# Patient Record
Sex: MALE | Race: WHITE | NOT HISPANIC OR LATINO | URBAN - NONMETROPOLITAN AREA
[De-identification: names, ages, dates, MRNs, and addresses within clinical notes are randomized per-mention and may not be internally consistent; named-entity substitution may affect disease eponyms.]

---

## 2023-05-04 ENCOUNTER — LAB OUTSIDE AN ENCOUNTER (OUTPATIENT)
Dept: URBAN - NONMETROPOLITAN AREA CLINIC 13 | Facility: CLINIC | Age: 68
End: 2023-05-04

## 2023-05-04 ENCOUNTER — WEB ENCOUNTER (OUTPATIENT)
Dept: URBAN - NONMETROPOLITAN AREA CLINIC 13 | Facility: CLINIC | Age: 68
End: 2023-05-04

## 2023-05-04 ENCOUNTER — OFFICE VISIT (OUTPATIENT)
Dept: URBAN - NONMETROPOLITAN AREA CLINIC 13 | Facility: CLINIC | Age: 68
End: 2023-05-04
Payer: MEDICARE

## 2023-05-04 ENCOUNTER — LAB OUTSIDE AN ENCOUNTER (OUTPATIENT)
Dept: URBAN - NONMETROPOLITAN AREA CLINIC 2 | Facility: CLINIC | Age: 68
End: 2023-05-04

## 2023-05-04 VITALS
BODY MASS INDEX: 26.51 KG/M2 | DIASTOLIC BLOOD PRESSURE: 91 MMHG | HEART RATE: 71 BPM | HEIGHT: 70 IN | WEIGHT: 185.2 LBS | SYSTOLIC BLOOD PRESSURE: 184 MMHG

## 2023-05-04 DIAGNOSIS — B18.2 CHRONIC HEPATITIS C WITHOUT HEPATIC COMA: ICD-10-CM

## 2023-05-04 DIAGNOSIS — K74.69 OTHER CIRRHOSIS OF LIVER: ICD-10-CM

## 2023-05-04 PROBLEM — 19943007: Status: ACTIVE | Noted: 2023-05-04

## 2023-05-04 PROBLEM — 128302006: Status: ACTIVE | Noted: 2023-05-04

## 2023-05-04 PROCEDURE — 99204 OFFICE O/P NEW MOD 45 MIN: CPT | Performed by: INTERNAL MEDICINE

## 2023-05-04 RX ORDER — ATORVASTATIN CALCIUM 20 MG/1
TABLET, FILM COATED ORAL
Qty: 90 TABLET | Status: ACTIVE | COMMUNITY

## 2023-05-04 RX ORDER — TAFLUPROST 0.01 MG/ML
SOLUTION/ DROPS OPHTHALMIC
Qty: 90 EACH | Status: ACTIVE | COMMUNITY

## 2023-05-04 RX ORDER — FLUOROMETHOLONE 1 MG/ML
INSTILL ONE DROP INTO EACH EYE TWICE DAILY FOR 1 WEEK THEN STOP SOLUTION/ DROPS OPHTHALMIC
Qty: 10 UNSPECIFIED | Refills: 0 | Status: ACTIVE | COMMUNITY

## 2023-05-04 NOTE — HPI-TODAY'S VISIT:
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  A copy of records will be sent to his office.  He was previously seen by me for hepatitis C cirrhosis and treated with Harvoni.  He was eradicated.  He then saw Dr. Mcdaniels.  He has been seeing him routinely.  His MRIs have been normal.  He does not have any overt encephalopathy.  He has been on rifaximin and lactulose in the past without significant improvement.  He has not had labs in over 6 months.  He has grade 1 varices but cannot tolerate beta-blockers due to depression.  Currently he seems to be doing well without any bleeding, encephalopathy or other significant symptoms.

## 2023-05-07 LAB
A/G RATIO: 1.7
ALBUMIN: 4.6
ALKALINE PHOSPHATASE: 74
ALT (SGPT): 55
ANION GAP: 13
AST (SGOT): 30
BILIRUBIN TOTAL: 0.7
BLOOD UREA NITROGEN: 14
BUN / CREAT RATIO: 14
CALCIUM: 9.4
CHLORIDE: 104
CO2: 27
CREATININE, SERUM: 0.98
EGFR (CKD-EPI): >60
GLUCOSE: 99
HEMATOCRIT: 44.4
HEMOGLOBIN: 15
INR: 1.1
MEAN CORPUSCULAR HEMOGLOBIN CONC: 33.8
MEAN CORPUSCULAR HEMOGLOBIN: 30.3
MEAN CORPUSCULAR VOLUME: 89.5
MEAN PLATELET VOLUME: 10.3
PLATELET COUNT: 248
POTASSIUM: 4
PROTEIN TOTAL: 7.3
PROTHROMBIN TIME: 12.2
RED BLOOD CELL COUNT: 4.95
RED CELL DISTRIBUTION WIDTH: 14
SODIUM: 140
WHITE BLOOD CELL COUNT: 9.3

## 2023-05-13 LAB — AFP-TUMOR MARKER: 2.6

## 2023-09-15 ENCOUNTER — OFFICE VISIT (OUTPATIENT)
Dept: URBAN - METROPOLITAN AREA MEDICAL CENTER 1 | Facility: MEDICAL CENTER | Age: 68
End: 2023-09-15
Payer: MEDICARE

## 2023-09-15 ENCOUNTER — LAB OUTSIDE AN ENCOUNTER (OUTPATIENT)
Dept: URBAN - NONMETROPOLITAN AREA CLINIC 2 | Facility: CLINIC | Age: 68
End: 2023-09-15

## 2023-09-15 DIAGNOSIS — K29.60 ADENOPAPILLOMATOSIS GASTRICA: ICD-10-CM

## 2023-09-15 DIAGNOSIS — K74.60 ADVANCED CIRRHOSIS: ICD-10-CM

## 2023-09-15 PROCEDURE — 43239 EGD BIOPSY SINGLE/MULTIPLE: CPT | Performed by: INTERNAL MEDICINE

## 2023-09-20 LAB
AP CASE REPORT: (no result)
AP FINAL DIAGNOSIS: (no result)
AP GROSS DESCRIPTION: (no result)
AP MICROSCOPIC DESCRIPTION: (no result)
AP SPECIAL STAINS: (no result)

## 2023-11-09 ENCOUNTER — OFFICE VISIT (OUTPATIENT)
Dept: URBAN - NONMETROPOLITAN AREA CLINIC 13 | Facility: CLINIC | Age: 68
End: 2023-11-09
Payer: MEDICARE

## 2023-11-09 ENCOUNTER — DASHBOARD ENCOUNTERS (OUTPATIENT)
Age: 68
End: 2023-11-09

## 2023-11-09 VITALS
HEART RATE: 98 BPM | SYSTOLIC BLOOD PRESSURE: 165 MMHG | HEIGHT: 70 IN | BODY MASS INDEX: 25.91 KG/M2 | WEIGHT: 181 LBS | DIASTOLIC BLOOD PRESSURE: 98 MMHG

## 2023-11-09 DIAGNOSIS — B18.2 CHRONIC HEPATITIS C WITHOUT HEPATIC COMA: ICD-10-CM

## 2023-11-09 DIAGNOSIS — K74.69 OTHER CIRRHOSIS OF LIVER: ICD-10-CM

## 2023-11-09 PROCEDURE — 99214 OFFICE O/P EST MOD 30 MIN: CPT | Performed by: INTERNAL MEDICINE

## 2023-11-09 RX ORDER — ATORVASTATIN CALCIUM 20 MG/1
TABLET, FILM COATED ORAL
Qty: 90 TABLET | Status: ACTIVE | COMMUNITY

## 2023-11-09 RX ORDER — FLUOROMETHOLONE 1 MG/ML
INSTILL ONE DROP INTO EACH EYE TWICE DAILY FOR 1 WEEK THEN STOP SOLUTION/ DROPS OPHTHALMIC
Qty: 10 UNSPECIFIED | Refills: 0 | Status: ACTIVE | COMMUNITY

## 2023-11-09 RX ORDER — TAFLUPROST 0.01 MG/ML
SOLUTION/ DROPS OPHTHALMIC
Qty: 90 EACH | Status: ACTIVE | COMMUNITY

## 2023-11-09 NOTE — HPI-TODAY'S VISIT:
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  A copy of records will be sent to his office.  He was previously seen by me for hepatitis C cirrhosis and treated with Harvoni.  He was eradicated.  He then saw Dr. Mcdaniels.  He has been seeing him routinely.  His MRIs have been normal.  He does not have any overt encephalopathy.  He has been on rifaximin and lactulose in the past without significant improvement.  He has not had labs in over 6 months.  He has grade 1 varices but cannot tolerate beta-blockers due to depression.  Currently he seems to be doing well without any bleeding, encephalopathy or other significant symptoms. 11/9/2023  Mini returns for follow-up.  He has cirrhosis due to past, treated hepatitis C.  He is currently doing well.  He denies any encephalopathy, ascites, or edema.  He had an MRI with his PCP this summer that was unremarkable reportedly.  He is due for labs today.  Upper endoscopy did not suggest significant

## 2024-05-03 ENCOUNTER — WEB ENCOUNTER (OUTPATIENT)
Dept: URBAN - NONMETROPOLITAN AREA CLINIC 13 | Facility: CLINIC | Age: 69
End: 2024-05-03

## 2024-05-09 ENCOUNTER — OFFICE VISIT (OUTPATIENT)
Dept: URBAN - NONMETROPOLITAN AREA CLINIC 13 | Facility: CLINIC | Age: 69
End: 2024-05-09

## 2024-07-09 ENCOUNTER — APPOINTMENT (OUTPATIENT)
Dept: URBAN - NONMETROPOLITAN AREA CLINIC 45 | Age: 69
Setting detail: DERMATOLOGY
End: 2024-07-09

## 2024-07-09 DIAGNOSIS — D22 MELANOCYTIC NEVI: ICD-10-CM

## 2024-07-09 DIAGNOSIS — D18.0 HEMANGIOMA: ICD-10-CM

## 2024-07-09 DIAGNOSIS — L21.8 OTHER SEBORRHEIC DERMATITIS: ICD-10-CM

## 2024-07-09 DIAGNOSIS — L57.8 OTHER SKIN CHANGES DUE TO CHRONIC EXPOSURE TO NONIONIZING RADIATION: ICD-10-CM

## 2024-07-09 DIAGNOSIS — L82.1 OTHER SEBORRHEIC KERATOSIS: ICD-10-CM

## 2024-07-09 PROBLEM — D48.5 NEOPLASM OF UNCERTAIN BEHAVIOR OF SKIN: Status: ACTIVE | Noted: 2024-07-09

## 2024-07-09 PROBLEM — D18.01 HEMANGIOMA OF SKIN AND SUBCUTANEOUS TISSUE: Status: ACTIVE | Noted: 2024-07-09

## 2024-07-09 PROBLEM — D22.71 MELANOCYTIC NEVI OF RIGHT LOWER LIMB, INCLUDING HIP: Status: ACTIVE | Noted: 2024-07-09

## 2024-07-09 PROCEDURE — OTHER OBSERVATION: OTHER

## 2024-07-09 PROCEDURE — 99204 OFFICE O/P NEW MOD 45 MIN: CPT

## 2024-07-09 PROCEDURE — OTHER PRESCRIPTION MEDICATION MANAGEMENT: OTHER

## 2024-07-09 PROCEDURE — OTHER COUNSELING: OTHER

## 2024-07-09 PROCEDURE — OTHER MIPS QUALITY: OTHER

## 2024-07-09 PROCEDURE — OTHER ADDITIONAL NOTES: OTHER

## 2024-07-09 ASSESSMENT — LOCATION SIMPLE DESCRIPTION DERM
LOCATION SIMPLE: LEFT ANTERIOR NECK
LOCATION SIMPLE: ABDOMEN
LOCATION SIMPLE: RIGHT THIGH
LOCATION SIMPLE: LEFT UPPER ARM

## 2024-07-09 ASSESSMENT — LOCATION DETAILED DESCRIPTION DERM
LOCATION DETAILED: LEFT ANTERIOR DISTAL UPPER ARM
LOCATION DETAILED: LEFT CLAVICULAR NECK
LOCATION DETAILED: LEFT LATERAL ABDOMEN
LOCATION DETAILED: RIGHT ANTERIOR DISTAL THIGH

## 2024-07-09 ASSESSMENT — LOCATION ZONE DERM
LOCATION ZONE: ARM
LOCATION ZONE: NECK
LOCATION ZONE: TRUNK
LOCATION ZONE: LEG

## 2024-07-09 NOTE — PROCEDURE: PRESCRIPTION MEDICATION MANAGEMENT
Render In Strict Bullet Format?: No
Detail Level: Zone
Plan: Ketoconazole Cream 2% apply twice a day as needed for facial scaling (patient has)

## 2024-07-09 NOTE — PROCEDURE: ADDITIONAL NOTES
Render Risk Assessment In Note?: no
Detail Level: Simple
Additional Notes: Consider Efudex treatment in the fall (face)

## 2024-07-18 ENCOUNTER — OFFICE VISIT (OUTPATIENT)
Dept: URBAN - NONMETROPOLITAN AREA CLINIC 13 | Facility: CLINIC | Age: 69
End: 2024-07-18
Payer: MEDICARE

## 2024-07-18 ENCOUNTER — LAB OUTSIDE AN ENCOUNTER (OUTPATIENT)
Dept: URBAN - NONMETROPOLITAN AREA CLINIC 13 | Facility: CLINIC | Age: 69
End: 2024-07-18

## 2024-07-18 VITALS
HEIGHT: 70 IN | SYSTOLIC BLOOD PRESSURE: 149 MMHG | WEIGHT: 170.6 LBS | HEART RATE: 67 BPM | DIASTOLIC BLOOD PRESSURE: 77 MMHG | BODY MASS INDEX: 24.42 KG/M2

## 2024-07-18 DIAGNOSIS — K74.69 OTHER CIRRHOSIS OF LIVER: ICD-10-CM

## 2024-07-18 DIAGNOSIS — B18.2 CHRONIC HEPATITIS C WITHOUT HEPATIC COMA: ICD-10-CM

## 2024-07-18 PROCEDURE — 99214 OFFICE O/P EST MOD 30 MIN: CPT | Performed by: INTERNAL MEDICINE

## 2024-07-18 RX ORDER — FLUOROMETHOLONE 1 MG/ML
INSTILL ONE DROP INTO EACH EYE TWICE DAILY FOR 1 WEEK THEN STOP SOLUTION/ DROPS OPHTHALMIC
Qty: 10 UNSPECIFIED | Refills: 0 | Status: ACTIVE | COMMUNITY

## 2024-07-18 RX ORDER — ATORVASTATIN CALCIUM 20 MG/1
TABLET, FILM COATED ORAL
Qty: 90 TABLET | Status: ACTIVE | COMMUNITY

## 2024-07-18 RX ORDER — TAFLUPROST 0.01 MG/ML
SOLUTION/ DROPS OPHTHALMIC
Qty: 90 EACH | Status: ACTIVE | COMMUNITY

## 2024-07-30 ENCOUNTER — LAB OUTSIDE AN ENCOUNTER (OUTPATIENT)
Dept: URBAN - NONMETROPOLITAN AREA CLINIC 2 | Facility: CLINIC | Age: 69
End: 2024-07-30

## 2024-07-30 LAB
A/G RATIO: 2
ALBUMIN: 4.5
ALKALINE PHOSPHATASE: 65
ALT (SGPT): 30
ANION GAP: 14
AST (SGOT): 27
BASOPHILS AUTOMATED ABSOLUTE COUNT: 0.1
BASOPHILS RELATIVE PERCENT: 0.9
BILIRUBIN TOTAL: 0.7
BLOOD UREA NITROGEN: 12
BUN / CREAT RATIO: 13
CALCIUM: 9
CHLORIDE: 105
CO2: 26
CREATININE, SERUM: 0.91
EGFR (CKD-EPI): >60
EOSINOPHILS AUTOMATED ABSOLUTE COUNT: 0.3
EOSINOPHILS RELATIVE PERCENT: 4.5
GLUCOSE: 127
HEMATOCRIT: 42
HEMOGLOBIN: 13.9
LYMPHOCYTES AUTOMATED ABSOLUTE COUNT: 1.7
LYMPHOCYTES RELATIVE PERCENT: 22.2
MEAN CORPUSCULAR HEMOGLOBIN CONC: 33.1
MEAN CORPUSCULAR HEMOGLOBIN: 29.8
MEAN CORPUSCULAR VOLUME: 90.3
MEAN PLATELET VOLUME: 9.6
MONOCYTES AUTOMATED ABSOLUTE COUNT: 0.9
MONOCYTES RELATIVE PERCENT: 11.8
NEUTROPHILS AUTOMATED ABSOLUTE: 4.6
NEUTROPHILS RELATIVE PERCENT: 60.6
PLATELET COUNT: 250
POTASSIUM: 4.4
PROTEIN TOTAL: 6.8
RED BLOOD CELL COUNT: 4.65
RED CELL DISTRIBUTION WIDTH: 14
SODIUM: 141
WHITE BLOOD CELL COUNT: 7.6

## 2024-08-01 ENCOUNTER — TELEPHONE ENCOUNTER (OUTPATIENT)
Dept: URBAN - NONMETROPOLITAN AREA CLINIC 2 | Facility: CLINIC | Age: 69
End: 2024-08-01

## 2024-08-01 ENCOUNTER — LAB OUTSIDE AN ENCOUNTER (OUTPATIENT)
Dept: URBAN - NONMETROPOLITAN AREA CLINIC 2 | Facility: CLINIC | Age: 69
End: 2024-08-01

## 2024-08-05 ENCOUNTER — TELEPHONE ENCOUNTER (OUTPATIENT)
Dept: URBAN - NONMETROPOLITAN AREA CLINIC 13 | Facility: CLINIC | Age: 69
End: 2024-08-05

## 2024-08-16 ENCOUNTER — TELEPHONE ENCOUNTER (OUTPATIENT)
Dept: URBAN - NONMETROPOLITAN AREA CLINIC 2 | Facility: CLINIC | Age: 69
End: 2024-08-16

## 2025-01-02 ENCOUNTER — OFFICE VISIT (OUTPATIENT)
Dept: URBAN - NONMETROPOLITAN AREA CLINIC 13 | Facility: CLINIC | Age: 70
End: 2025-01-02
Payer: MEDICARE

## 2025-01-02 ENCOUNTER — LAB OUTSIDE AN ENCOUNTER (OUTPATIENT)
Dept: URBAN - NONMETROPOLITAN AREA CLINIC 2 | Facility: CLINIC | Age: 70
End: 2025-01-02

## 2025-01-02 VITALS
DIASTOLIC BLOOD PRESSURE: 76 MMHG | BODY MASS INDEX: 26.11 KG/M2 | HEART RATE: 60 BPM | WEIGHT: 182.4 LBS | HEIGHT: 70 IN | SYSTOLIC BLOOD PRESSURE: 138 MMHG

## 2025-01-02 DIAGNOSIS — B18.2 CHRONIC HEPATITIS C WITHOUT HEPATIC COMA: ICD-10-CM

## 2025-01-02 DIAGNOSIS — K74.69 OTHER CIRRHOSIS OF LIVER: ICD-10-CM

## 2025-01-02 PROCEDURE — 99214 OFFICE O/P EST MOD 30 MIN: CPT | Performed by: INTERNAL MEDICINE

## 2025-01-02 RX ORDER — TAFLUPROST 0.01 MG/ML
SOLUTION/ DROPS OPHTHALMIC
Qty: 90 EACH | Status: ACTIVE | COMMUNITY

## 2025-01-02 RX ORDER — ATORVASTATIN CALCIUM 20 MG/1
TABLET, FILM COATED ORAL
Qty: 90 TABLET | Status: ACTIVE | COMMUNITY

## 2025-01-02 RX ORDER — FLUOROMETHOLONE 1 MG/ML
INSTILL ONE DROP INTO EACH EYE TWICE DAILY FOR 1 WEEK THEN STOP SOLUTION/ DROPS OPHTHALMIC
Qty: 10 UNSPECIFIED | Refills: 0 | Status: ACTIVE | COMMUNITY

## 2025-01-02 NOTE — HPI-TODAY'S VISIT:
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  A copy of records will be sent to his office.  He was previously seen by me for hepatitis C cirrhosis and treated with Harvoni.  He was eradicated.  He then saw Dr. Mcdaniels.  He has been seeing him routinely.  His MRIs have been normal.  He does not have any overt encephalopathy.  He has been on rifaximin and lactulose in the past without significant improvement.  He has not had labs in over 6 months.  He has grade 1 varices but cannot tolerate beta-blockers due to depression.  Currently he seems to be doing well without any bleeding, encephalopathy or other significant symptoms. 11/9/2023 Neeru Morse returns for follow-up.  He has cirrhosis due to past, treated hepatitis C.  He is currently doing well.  He denies any encephalopathy, ascites, or edema.  He had an MRI with his PCP this summer that was unremarkable reportedly.  He is due for labs today.  Upper endoscopy did not suggest significant 7/18/2024 Dr. Morse  returns for follow-up for cirrhosis due to past hepatitis C.  He is doing well.  He is due for labs and MRI for HCC surveillance.  He has no acute GI issues.  He has lost some weight.  He remains very active.  He denies any GI bleeding, ascites, edema, or encephalopathy 1/2/2025 Dr. Morse returns for follow-up.  He has cirrhosis.  History of hepatitis C status posttreatment.  He is doing well.  Labs last summer were normal.  MRI was normal.  Last EGD in 2023 was without varices.  He remains active.  He does say that he recently was told his A1c was slightly high.  He is trying to watch his diet and exercise.  He remains very active, racing motocross and sim racing.

## 2025-01-06 LAB — AFP-TUMOR MARKER: 2.1

## 2025-07-17 ENCOUNTER — OFFICE VISIT (OUTPATIENT)
Dept: URBAN - NONMETROPOLITAN AREA CLINIC 13 | Facility: CLINIC | Age: 70
End: 2025-07-17
Payer: MEDICARE

## 2025-07-17 ENCOUNTER — LAB OUTSIDE AN ENCOUNTER (OUTPATIENT)
Dept: URBAN - NONMETROPOLITAN AREA CLINIC 2 | Facility: CLINIC | Age: 70
End: 2025-07-17

## 2025-07-17 ENCOUNTER — LAB OUTSIDE AN ENCOUNTER (OUTPATIENT)
Dept: URBAN - NONMETROPOLITAN AREA CLINIC 13 | Facility: CLINIC | Age: 70
End: 2025-07-17

## 2025-07-17 DIAGNOSIS — K74.69 OTHER CIRRHOSIS OF LIVER: ICD-10-CM

## 2025-07-17 DIAGNOSIS — B18.2 CHRONIC HEPATITIS C WITHOUT HEPATIC COMA: ICD-10-CM

## 2025-07-17 LAB
BASOPHILS AUTOMATED ABSOLUTE COUNT: 0.1
BASOPHILS RELATIVE PERCENT: 1.3
C-REACTIVE PROTEIN: 0.03
EOSINOPHILS AUTOMATED ABSOLUTE COUNT: 0.4
EOSINOPHILS RELATIVE PERCENT: 4.7
HEMATOCRIT: 46.1
HEMOGLOBIN: 15.5
LYMPHOCYTES AUTOMATED ABSOLUTE COUNT: 2.1
LYMPHOCYTES RELATIVE PERCENT: 26
MEAN CORPUSCULAR HEMOGLOBIN CONC: 33.5
MEAN CORPUSCULAR HEMOGLOBIN: 30.4
MEAN CORPUSCULAR VOLUME: 90.6
MEAN PLATELET VOLUME: (no result)
MONOCYTES AUTOMATED ABSOLUTE COUNT: 0.8
MONOCYTES RELATIVE PERCENT: 9.9
NEUTROPHILS AUTOMATED ABSOLUTE: 4.8
NEUTROPHILS RELATIVE PERCENT: 58.1
PLATELET COUNT: 267
RED BLOOD CELL COUNT: 5.09
RED CELL DISTRIBUTION WIDTH: 14
WHITE BLOOD CELL COUNT: 8.3

## 2025-07-17 PROCEDURE — 99214 OFFICE O/P EST MOD 30 MIN: CPT | Performed by: INTERNAL MEDICINE

## 2025-07-17 RX ORDER — FLUOROMETHOLONE 1 MG/ML
INSTILL ONE DROP INTO EACH EYE TWICE DAILY FOR 1 WEEK THEN STOP SOLUTION/ DROPS OPHTHALMIC
Qty: 10 UNSPECIFIED | Refills: 0 | Status: ON HOLD | COMMUNITY

## 2025-07-17 RX ORDER — ATORVASTATIN CALCIUM 20 MG/1
TABLET, FILM COATED ORAL
Qty: 90 TABLET | Status: ACTIVE | COMMUNITY

## 2025-07-17 RX ORDER — TAFLUPROST 0.01 MG/ML
SOLUTION/ DROPS OPHTHALMIC
Qty: 90 EACH | Status: ON HOLD | COMMUNITY

## 2025-07-17 NOTE — HPI-TODAY'S VISIT:
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  A copy of records will be sent to his office.  He was previously seen by me for hepatitis C cirrhosis and treated with Harvoni.  He was eradicated.  He then saw Dr. Mcdaniels.  He has been seeing him routinely.  His MRIs have been normal.  He does not have any overt encephalopathy.  He has been on rifaximin and lactulose in the past without significant improvement.  He has not had labs in over 6 months.  He has grade 1 varices but cannot tolerate beta-blockers due to depression.  Currently he seems to be doing well without any bleeding, encephalopathy or other significant symptoms. 11/9/2023  Mini returns for follow-up.  He has cirrhosis due to past, treated hepatitis C.  He is currently doing well.  He denies any encephalopathy, ascites, or edema.  He had an MRI with his PCP this summer that was unremarkable reportedly.  He is due for labs today.  Upper endoscopy did not suggest significant 7/18/2024 Dr. Morse  returns for follow-up for cirrhosis due to past hepatitis C.  He is doing well.  He is due for labs and MRI for HCC surveillance.  He has no acute GI issues.  He has lost some weight.  He remains very active.  He denies any GI bleeding, ascites, edema, or encephalopathy 1/2/2025 Dr. Morse returns for follow-up.  He has cirrhosis.  History of hepatitis C status posttreatment.  He is doing well.  Labs last summer were normal.  MRI was normal.  Last EGD in 2023 was without varices.  He remains active.  He does say that he recently was told his A1c was slightly high.  He is trying to watch his diet and exercise.  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 7/17/2025 Navid returns for follow-up for compensated cirrhosis.  He has a history of hep C status posttreatment.  He is doing well.  He has no complaints or concerns today.  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  He has no concerns or complaints today.

## 2025-07-24 LAB — AFP-TUMOR MARKER: 2.2
